# Patient Record
Sex: FEMALE | Race: WHITE | NOT HISPANIC OR LATINO
[De-identification: names, ages, dates, MRNs, and addresses within clinical notes are randomized per-mention and may not be internally consistent; named-entity substitution may affect disease eponyms.]

---

## 2017-03-10 ENCOUNTER — HOSPITAL (OUTPATIENT)
Dept: OTHER | Age: 32
End: 2017-03-10
Attending: INTERNAL MEDICINE

## 2017-08-23 ENCOUNTER — CHARTING TRANS (OUTPATIENT)
Dept: OTHER | Age: 32
End: 2017-08-23

## 2017-08-23 ENCOUNTER — LAB SERVICES (OUTPATIENT)
Dept: OTHER | Age: 32
End: 2017-08-23

## 2017-08-23 LAB — MONOCLONAL PREGNANCY: POSITIVE

## 2017-08-26 ENCOUNTER — LAB SERVICES (OUTPATIENT)
Dept: OTHER | Age: 32
End: 2017-08-26

## 2017-08-28 ENCOUNTER — CHARTING TRANS (OUTPATIENT)
Dept: OTHER | Age: 32
End: 2017-08-28

## 2017-08-28 ENCOUNTER — HOSPITAL (OUTPATIENT)
Dept: OTHER | Age: 32
End: 2017-08-28
Attending: SPECIALIST

## 2017-08-28 ENCOUNTER — IMAGING SERVICES (OUTPATIENT)
Dept: OTHER | Age: 32
End: 2017-08-28

## 2017-08-28 LAB
CANDIDA RRNA VAG QL PROBE: NEGATIVE
G VAGINALIS RRNA GENITAL QL PROBE: NEGATIVE
HCG SERPL-ACNC: 1693 MUNITS/ML
HCG SERPL-ACNC: 3229 MUNITS/ML
PROGEST SERPL-MCNC: 3.68 NG/ML
T VAGINALIS RRNA GENITAL QL PROBE: NEGATIVE

## 2017-08-29 ENCOUNTER — LAB SERVICES (OUTPATIENT)
Dept: OTHER | Age: 32
End: 2017-08-29

## 2017-08-29 ENCOUNTER — CHARTING TRANS (OUTPATIENT)
Dept: OTHER | Age: 32
End: 2017-08-29

## 2017-08-30 ENCOUNTER — CHARTING TRANS (OUTPATIENT)
Dept: OTHER | Age: 32
End: 2017-08-30

## 2017-08-30 LAB — HCG SERPL-ACNC: 3825 MUNITS/ML

## 2017-09-05 ENCOUNTER — IMAGING SERVICES (OUTPATIENT)
Dept: OTHER | Age: 32
End: 2017-09-05

## 2017-09-05 ENCOUNTER — HOSPITAL (OUTPATIENT)
Dept: OTHER | Age: 32
End: 2017-09-05
Attending: SPECIALIST

## 2017-09-06 ENCOUNTER — CHARTING TRANS (OUTPATIENT)
Dept: OTHER | Age: 32
End: 2017-09-06

## 2017-09-12 ENCOUNTER — MYAURORA ACCOUNT LINK (OUTPATIENT)
Dept: OTHER | Age: 32
End: 2017-09-12

## 2017-09-12 ENCOUNTER — HOSPITAL (OUTPATIENT)
Dept: OTHER | Age: 32
End: 2017-09-12
Attending: SPECIALIST

## 2017-09-12 ENCOUNTER — LAB SERVICES (OUTPATIENT)
Dept: OTHER | Age: 32
End: 2017-09-12

## 2017-09-12 ENCOUNTER — IMAGING SERVICES (OUTPATIENT)
Dept: OTHER | Age: 32
End: 2017-09-12

## 2017-09-12 ENCOUNTER — CHARTING TRANS (OUTPATIENT)
Dept: OTHER | Age: 32
End: 2017-09-12

## 2017-09-13 ENCOUNTER — CHARTING TRANS (OUTPATIENT)
Dept: OTHER | Age: 32
End: 2017-09-13

## 2017-09-13 ENCOUNTER — HOSPITAL (OUTPATIENT)
Dept: OTHER | Age: 32
End: 2017-09-13
Attending: SPECIALIST

## 2017-09-13 LAB — HCG SERPL-ACNC: 750 MUNITS/ML

## 2017-09-18 LAB — SURGICAL PATHOLOGY: NORMAL

## 2018-11-03 VITALS
SYSTOLIC BLOOD PRESSURE: 112 MMHG | TEMPERATURE: 97.9 F | HEART RATE: 72 BPM | WEIGHT: 201.06 LBS | BODY MASS INDEX: 30.47 KG/M2 | DIASTOLIC BLOOD PRESSURE: 76 MMHG | HEIGHT: 68 IN

## 2018-11-03 VITALS
SYSTOLIC BLOOD PRESSURE: 129 MMHG | BODY MASS INDEX: 30.61 KG/M2 | HEIGHT: 68 IN | DIASTOLIC BLOOD PRESSURE: 82 MMHG | WEIGHT: 201.94 LBS

## 2018-11-03 VITALS — BODY MASS INDEX: 30.81 KG/M2 | HEIGHT: 68 IN | WEIGHT: 203.26 LBS

## 2019-01-17 ENCOUNTER — APPOINTMENT (OUTPATIENT)
Dept: URBAN - METROPOLITAN AREA CLINIC 236 | Age: 34
Setting detail: DERMATOLOGY
End: 2019-01-17

## 2019-01-17 VITALS — DIASTOLIC BLOOD PRESSURE: 76 MMHG | SYSTOLIC BLOOD PRESSURE: 112 MMHG

## 2019-01-17 DIAGNOSIS — L65.9 NONSCARRING HAIR LOSS, UNSPECIFIED: ICD-10-CM

## 2019-01-17 DIAGNOSIS — R59.0 LOCALIZED ENLARGED LYMPH NODES: ICD-10-CM

## 2019-01-17 DIAGNOSIS — L70.0 ACNE VULGARIS: ICD-10-CM

## 2019-01-17 PROCEDURE — OTHER ADDITIONAL NOTES: OTHER

## 2019-01-17 PROCEDURE — OTHER ORDER TESTS: OTHER

## 2019-01-17 PROCEDURE — OTHER DEFER: OTHER

## 2019-01-17 PROCEDURE — OTHER TREATMENT REGIMEN: OTHER

## 2019-01-17 PROCEDURE — OTHER COUNSELING: OTHER

## 2019-01-17 PROCEDURE — 99202 OFFICE O/P NEW SF 15 MIN: CPT

## 2019-01-17 ASSESSMENT — LOCATION ZONE DERM
LOCATION ZONE: FACE
LOCATION ZONE: SCALP
LOCATION ZONE: NECK

## 2019-01-17 ASSESSMENT — LOCATION SIMPLE DESCRIPTION DERM
LOCATION SIMPLE: RIGHT CHEEK
LOCATION SIMPLE: LEFT CHEEK
LOCATION SIMPLE: SCALP
LOCATION SIMPLE: POSTERIOR SCALP
LOCATION SIMPLE: NECK

## 2019-01-17 ASSESSMENT — LOCATION DETAILED DESCRIPTION DERM
LOCATION DETAILED: LEFT INFERIOR MEDIAL MALAR CHEEK
LOCATION DETAILED: RIGHT INFERIOR OCCIPITAL SCALP
LOCATION DETAILED: RIGHT CENTRAL MALAR CHEEK
LOCATION DETAILED: RIGHT CENTRAL LATERAL NECK
LOCATION DETAILED: LEFT INFERIOR OCCIPITAL SCALP
LOCATION DETAILED: RIGHT SUPERIOR PARIETAL SCALP
LOCATION DETAILED: LEFT SUPERIOR PARIETAL SCALP

## 2019-01-17 NOTE — HPI: PIMPLES (ACNE)
Is This A New Presentation, Or A Follow-Up?: Acne
Additional Comments (Use Complete Sentences): Patient requesting refill of spironolactone 50mg qd. Patient ran out of spironolactone x 2 weeks ago. Patient has been taking since September. She uses differin otc. Normally written by OB/GYN. Patient states that she does not get dizzy or waking up at night to urinate. Nexplanon implanted in December.
Females Only: When Was Your Last Menstrual Period?: 12/23/18

## 2019-01-17 NOTE — HPI: HAIR LOSS
How Did The Hair Loss Occur?: sudden in onset
Additional History: Patient has hypothyroid and is managed by her OB/GYN. She states that when her hair starts falling out it’s usually accompanied by fatigue and her nails start peeling and her medication (synthroid) needs to be adjusted. She is not having those symptoms currently. She saw her OB/GYN a month ago and had labs drawn but states that the phlebotomist did not draw enough blood and she has not had time to go back. She is currently having joint pain in her hands and knees and has an appointment with a rheumatologist in late February. Patient was on hormone (IVF) therapy for infertility for 8 months ending in August of 2018. Patient has endocrinologist in [place]. She is only in [place] temporarily. Diagnosed x 8 years ago with hypothyroidism. She does not wear tight margarita.

## 2019-01-17 NOTE — PROCEDURE: ADDITIONAL NOTES
Detail Level: Simple
Additional Notes: Discussed dermajet treatments
Additional Notes: Labs: Androgen profile
Additional Notes: May refill spironolactone 50mg if all labs wnl

## 2019-01-17 NOTE — PROCEDURE: DEFER
Procedure To Be Performed At Next Visit: Other
Other Procedure: hair collection
Detail Level: Zone
Scheduling Instructions (Optional): patient to bring in collection after 3 days in [facility]

## 2019-01-17 NOTE — PROCEDURE: TREATMENT REGIMEN
Continue Regimen: Differin otc
Augusto La Roche-Posay Products: No
Detail Level: Zone
Action 3: Continue

## 2019-01-17 NOTE — HPI: CYST
Is This A New Presentation, Or A Follow-Up?: Cysts
Additional History: Sizes of cysts fluctuate with menses per patient.

## 2019-01-26 ENCOUNTER — APPOINTMENT (OUTPATIENT)
Dept: URBAN - METROPOLITAN AREA CLINIC 236 | Age: 34
Setting detail: DERMATOLOGY
End: 2019-01-27

## 2019-01-26 DIAGNOSIS — L65.9 NONSCARRING HAIR LOSS, UNSPECIFIED: ICD-10-CM

## 2019-01-26 PROCEDURE — OTHER HAIR CLIPPING FOR HISTOLOGY: OTHER

## 2019-01-26 ASSESSMENT — LOCATION ZONE DERM: LOCATION ZONE: SCALP

## 2019-01-26 ASSESSMENT — LOCATION SIMPLE DESCRIPTION DERM: LOCATION SIMPLE: SCALP

## 2019-01-26 ASSESSMENT — LOCATION DETAILED DESCRIPTION DERM: LOCATION DETAILED: RIGHT SUPERIOR PARIETAL SCALP
